# Patient Record
Sex: FEMALE | Race: WHITE | Employment: OTHER | URBAN - METROPOLITAN AREA
[De-identification: names, ages, dates, MRNs, and addresses within clinical notes are randomized per-mention and may not be internally consistent; named-entity substitution may affect disease eponyms.]

---

## 2018-05-23 PROBLEM — I35.8 AORTIC VALVE SCLEROSIS: Status: ACTIVE | Noted: 2018-05-23

## 2018-05-23 PROBLEM — R93.1 ABNORMAL ECHOCARDIOGRAM: Status: ACTIVE | Noted: 2018-05-23

## 2018-05-23 PROBLEM — I51.7 ATRIAL ENLARGEMENT, BILATERAL: Status: ACTIVE | Noted: 2018-05-23

## 2020-07-29 PROBLEM — I25.5 ISCHEMIC CARDIOMYOPATHY: Status: ACTIVE | Noted: 2020-07-29

## 2023-07-22 ENCOUNTER — APPOINTMENT (OUTPATIENT)
Dept: CT IMAGING | Age: 88
DRG: 439 | End: 2023-07-22
Payer: MEDICARE

## 2023-07-22 ENCOUNTER — APPOINTMENT (OUTPATIENT)
Dept: GENERAL RADIOLOGY | Age: 88
DRG: 439 | End: 2023-07-22
Payer: MEDICARE

## 2023-07-22 ENCOUNTER — HOSPITAL ENCOUNTER (INPATIENT)
Age: 88
LOS: 5 days | Discharge: HOSPICE | DRG: 439 | End: 2023-07-27
Attending: EMERGENCY MEDICINE | Admitting: FAMILY MEDICINE
Payer: MEDICARE

## 2023-07-22 DIAGNOSIS — R18.8 OTHER ASCITES: ICD-10-CM

## 2023-07-22 DIAGNOSIS — R10.9 ABDOMINAL PAIN, UNSPECIFIED ABDOMINAL LOCATION: Primary | ICD-10-CM

## 2023-07-22 PROBLEM — E03.9 ACQUIRED HYPOTHYROIDISM: Status: ACTIVE | Noted: 2022-11-06

## 2023-07-22 PROBLEM — K59.01 SLOW TRANSIT CONSTIPATION: Status: ACTIVE | Noted: 2022-10-30

## 2023-07-22 PROBLEM — D48.5 NEOPLASM OF UNCERTAIN BEHAVIOR OF SKIN: Status: ACTIVE | Noted: 2021-01-20
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
## 2023-07-22 PROBLEM — M41.9 SCOLIOSIS: Status: ACTIVE | Noted: 2021-01-20

## 2023-07-22 PROBLEM — K80.20 CALCULUS OF GALLBLADDER WITHOUT CHOLECYSTITIS WITHOUT OBSTRUCTION: Status: ACTIVE | Noted: 2023-07-22

## 2023-07-22 PROBLEM — S32.592D OTHER SPECIFIED FRACTURE OF LEFT PUBIS, SUBSEQUENT ENCOUNTER FOR FRACTURE WITH ROUTINE HEALING: Status: ACTIVE | Noted: 2022-10-30

## 2023-07-22 PROBLEM — I50.32 CHRONIC DIASTOLIC HEART FAILURE (HCC): Status: ACTIVE | Noted: 2022-10-30

## 2023-07-22 PROBLEM — I65.23 OCCLUSION AND STENOSIS OF BILATERAL CAROTID ARTERIES: Status: ACTIVE | Noted: 2021-01-20

## 2023-07-22 PROBLEM — R79.89 OTHER SPECIFIED ABNORMAL FINDINGS OF BLOOD CHEMISTRY: Status: ACTIVE | Noted: 2022-10-30

## 2023-07-22 PROBLEM — I48.0 PAROXYSMAL ATRIAL FIBRILLATION (HCC): Status: ACTIVE | Noted: 2023-05-15

## 2023-07-22 PROBLEM — S32.512D: Status: ACTIVE | Noted: 2022-10-30

## 2023-07-22 PROBLEM — I45.89 AV NODE DYSFUNCTION: Status: ACTIVE | Noted: 2022-06-07

## 2023-07-22 PROBLEM — Z66 DNR (DO NOT RESUSCITATE): Status: ACTIVE | Noted: 2023-07-22

## 2023-07-22 PROBLEM — M62.81 MUSCLE WEAKNESS (GENERALIZED): Status: ACTIVE | Noted: 2022-10-30

## 2023-07-22 PROBLEM — R10.84 GENERALIZED ABDOMINAL PAIN: Status: ACTIVE | Noted: 2023-07-22

## 2023-07-22 PROBLEM — R77.8 OTHER SPECIFIED ABNORMALITIES OF PLASMA PROTEINS: Status: ACTIVE | Noted: 2022-10-30

## 2023-07-22 PROBLEM — R11.2 NAUSEA AND VOMITING: Status: ACTIVE | Noted: 2023-07-22

## 2023-07-22 PROBLEM — K70.31 ASCITES DUE TO ALCOHOLIC CIRRHOSIS (HCC): Status: ACTIVE | Noted: 2023-07-22

## 2023-07-22 PROBLEM — R91.8 INFILTRATE OF LUNG PRESENT ON IMAGING OF CHEST: Status: ACTIVE | Noted: 2021-01-20

## 2023-07-22 PROBLEM — D64.9 ANEMIA: Status: ACTIVE | Noted: 2021-01-20

## 2023-07-22 PROBLEM — E53.8 COBALAMIN DEFICIENCY: Status: ACTIVE | Noted: 2021-01-20

## 2023-07-22 PROBLEM — R26.89 OTHER ABNORMALITIES OF GAIT AND MOBILITY: Status: ACTIVE | Noted: 2022-10-30

## 2023-07-22 LAB
ALBUMIN SERPL-MCNC: 2.8 G/DL (ref 3.5–5.2)
ALBUMIN/GLOB SERPL: 0.5 {RATIO} (ref 1–2.5)
ALP SERPL-CCNC: 173 U/L (ref 35–104)
ALT SERPL-CCNC: 54 U/L (ref 5–33)
AMYLASE SERPL-CCNC: 92 U/L (ref 28–100)
ANION GAP SERPL CALCULATED.3IONS-SCNC: 13 MMOL/L (ref 9–17)
AST SERPL-CCNC: 87 U/L
BACTERIA URNS QL MICRO: ABNORMAL
BASOPHILS # BLD: 0 K/UL (ref 0–0.2)
BASOPHILS NFR BLD: 0 % (ref 0–2)
BILIRUB DIRECT SERPL-MCNC: 0.7 MG/DL
BILIRUB INDIRECT SERPL-MCNC: 0.5 MG/DL (ref 0–1)
BILIRUB SERPL-MCNC: 1.2 MG/DL (ref 0.3–1.2)
BILIRUB UR QL STRIP: NEGATIVE
BNP SERPL-MCNC: 2514 PG/ML
BUN SERPL-MCNC: 79 MG/DL (ref 8–23)
CALCIUM SERPL-MCNC: 9.5 MG/DL (ref 8.6–10.4)
CHARACTER UR: ABNORMAL
CHLORIDE SERPL-SCNC: 101 MMOL/L (ref 98–107)
CLARITY UR: CLEAR
CO2 SERPL-SCNC: 21 MMOL/L (ref 20–31)
COLOR UR: YELLOW
CREAT SERPL-MCNC: 2.3 MG/DL (ref 0.5–0.9)
EOSINOPHIL # BLD: 0 K/UL (ref 0–0.4)
EOSINOPHILS RELATIVE PERCENT: 0 % (ref 1–4)
EPI CELLS #/AREA URNS HPF: ABNORMAL /HPF (ref 0–5)
ERYTHROCYTE [DISTWIDTH] IN BLOOD BY AUTOMATED COUNT: 16.9 % (ref 12.5–15.4)
GFR SERPL CREATININE-BSD FRML MDRD: 19 ML/MIN/1.73M2
GLUCOSE SERPL-MCNC: 218 MG/DL (ref 70–99)
GLUCOSE UR STRIP-MCNC: NEGATIVE MG/DL
HCT VFR BLD AUTO: 39.4 % (ref 36–46)
HGB BLD-MCNC: 13 G/DL (ref 12–16)
HGB UR QL STRIP.AUTO: NEGATIVE
KETONES UR STRIP-MCNC: ABNORMAL MG/DL
LACTATE BLDV-SCNC: 2.4 MMOL/L (ref 0.5–1.9)
LEUKOCYTE ESTERASE UR QL STRIP: NEGATIVE
LIPASE SERPL-CCNC: 160 U/L (ref 13–60)
LYMPHOCYTES NFR BLD: 2 K/UL (ref 1–4.8)
LYMPHOCYTES RELATIVE PERCENT: 22 % (ref 24–44)
MCH RBC QN AUTO: 32.9 PG (ref 26–34)
MCHC RBC AUTO-ENTMCNC: 33 G/DL (ref 31–37)
MCV RBC AUTO: 99.6 FL (ref 80–100)
METER GLUCOSE: 142 MG/DL (ref 65–105)
METER GLUCOSE: 146 MG/DL (ref 65–105)
METER GLUCOSE: 188 MG/DL (ref 65–105)
MONOCYTES NFR BLD: 0.6 K/UL (ref 0.1–1.2)
MONOCYTES NFR BLD: 7 % (ref 2–11)
MUCOUS THREADS URNS QL MICRO: ABNORMAL
NEUTROPHILS NFR BLD: 71 % (ref 36–66)
NEUTS SEG NFR BLD: 6.5 K/UL (ref 1.8–7.7)
NITRITE UR QL STRIP: NEGATIVE
PH UR STRIP: 5.5 [PH] (ref 5–8)
PLATELET # BLD AUTO: 213 K/UL (ref 140–450)
PMV BLD AUTO: 8.8 FL (ref 6–12)
POTASSIUM SERPL-SCNC: 5 MMOL/L (ref 3.7–5.3)
PROT SERPL-MCNC: 8.2 G/DL (ref 6.4–8.3)
PROT UR STRIP-MCNC: ABNORMAL MG/DL
RBC # BLD AUTO: 3.96 M/UL (ref 4–5.2)
RBC #/AREA URNS HPF: ABNORMAL /HPF (ref 0–2)
SODIUM SERPL-SCNC: 135 MMOL/L (ref 135–144)
SP GR UR STRIP: 1.02 (ref 1–1.03)
TROPONIN I SERPL HS-MCNC: 36 NG/L (ref 0–14)
UROBILINOGEN UR STRIP-ACNC: NORMAL EU/DL (ref 0–1)
WBC #/AREA URNS HPF: ABNORMAL /HPF (ref 0–5)
WBC OTHER # BLD: 9.3 K/UL (ref 3.5–11)

## 2023-07-22 PROCEDURE — 51798 US URINE CAPACITY MEASURE: CPT

## 2023-07-22 PROCEDURE — 6360000002 HC RX W HCPCS: Performed by: EMERGENCY MEDICINE

## 2023-07-22 PROCEDURE — 82150 ASSAY OF AMYLASE: CPT

## 2023-07-22 PROCEDURE — 74176 CT ABD & PELVIS W/O CONTRAST: CPT

## 2023-07-22 PROCEDURE — 2580000003 HC RX 258: Performed by: EMERGENCY MEDICINE

## 2023-07-22 PROCEDURE — 80048 BASIC METABOLIC PNL TOTAL CA: CPT

## 2023-07-22 PROCEDURE — 99285 EMERGENCY DEPT VISIT HI MDM: CPT

## 2023-07-22 PROCEDURE — 81001 URINALYSIS AUTO W/SCOPE: CPT

## 2023-07-22 PROCEDURE — 96375 TX/PRO/DX INJ NEW DRUG ADDON: CPT

## 2023-07-22 PROCEDURE — 2060000000 HC ICU INTERMEDIATE R&B

## 2023-07-22 PROCEDURE — 51702 INSERT TEMP BLADDER CATH: CPT

## 2023-07-22 PROCEDURE — 82947 ASSAY GLUCOSE BLOOD QUANT: CPT

## 2023-07-22 PROCEDURE — 71045 X-RAY EXAM CHEST 1 VIEW: CPT

## 2023-07-22 PROCEDURE — 6360000002 HC RX W HCPCS

## 2023-07-22 PROCEDURE — 96376 TX/PRO/DX INJ SAME DRUG ADON: CPT

## 2023-07-22 PROCEDURE — 83690 ASSAY OF LIPASE: CPT

## 2023-07-22 PROCEDURE — 83880 ASSAY OF NATRIURETIC PEPTIDE: CPT

## 2023-07-22 PROCEDURE — 85027 COMPLETE CBC AUTOMATED: CPT

## 2023-07-22 PROCEDURE — 84484 ASSAY OF TROPONIN QUANT: CPT

## 2023-07-22 PROCEDURE — 93005 ELECTROCARDIOGRAM TRACING: CPT

## 2023-07-22 PROCEDURE — 96374 THER/PROPH/DIAG INJ IV PUSH: CPT

## 2023-07-22 PROCEDURE — C9113 INJ PANTOPRAZOLE SODIUM, VIA: HCPCS | Performed by: EMERGENCY MEDICINE

## 2023-07-22 PROCEDURE — 99222 1ST HOSP IP/OBS MODERATE 55: CPT | Performed by: FAMILY MEDICINE

## 2023-07-22 PROCEDURE — 80076 HEPATIC FUNCTION PANEL: CPT

## 2023-07-22 PROCEDURE — 83605 ASSAY OF LACTIC ACID: CPT

## 2023-07-22 PROCEDURE — 36415 COLL VENOUS BLD VENIPUNCTURE: CPT

## 2023-07-22 RX ORDER — SODIUM CHLORIDE 0.9 % (FLUSH) 0.9 %
5-40 SYRINGE (ML) INJECTION PRN
Status: DISCONTINUED | OUTPATIENT
Start: 2023-07-22 | End: 2023-07-27 | Stop reason: HOSPADM

## 2023-07-22 RX ORDER — AMLODIPINE BESYLATE 5 MG/1
5 TABLET ORAL DAILY
Status: DISCONTINUED | OUTPATIENT
Start: 2023-07-22 | End: 2023-07-27 | Stop reason: HOSPADM

## 2023-07-22 RX ORDER — SODIUM CHLORIDE 0.9 % (FLUSH) 0.9 %
5-40 SYRINGE (ML) INJECTION EVERY 12 HOURS SCHEDULED
Status: DISCONTINUED | OUTPATIENT
Start: 2023-07-22 | End: 2023-07-27 | Stop reason: HOSPADM

## 2023-07-22 RX ORDER — ONDANSETRON 2 MG/ML
4 INJECTION INTRAMUSCULAR; INTRAVENOUS ONCE
Status: COMPLETED | OUTPATIENT
Start: 2023-07-22 | End: 2023-07-22

## 2023-07-22 RX ORDER — FUROSEMIDE 20 MG/1
20 TABLET ORAL DAILY
Status: DISCONTINUED | OUTPATIENT
Start: 2023-07-22 | End: 2023-07-27 | Stop reason: HOSPADM

## 2023-07-22 RX ORDER — MORPHINE SULFATE 10 MG/5ML
5 SOLUTION ORAL EVERY 6 HOURS PRN
Status: DISCONTINUED | OUTPATIENT
Start: 2023-07-22 | End: 2023-07-27 | Stop reason: HOSPADM

## 2023-07-22 RX ORDER — POTASSIUM CHLORIDE 7.45 MG/ML
10 INJECTION INTRAVENOUS PRN
Status: DISCONTINUED | OUTPATIENT
Start: 2023-07-22 | End: 2023-07-22

## 2023-07-22 RX ORDER — SODIUM CHLORIDE 9 MG/ML
INJECTION, SOLUTION INTRAVENOUS PRN
Status: DISCONTINUED | OUTPATIENT
Start: 2023-07-22 | End: 2023-07-23

## 2023-07-22 RX ORDER — HEPARIN SODIUM 5000 [USP'U]/ML
5000 INJECTION, SOLUTION INTRAVENOUS; SUBCUTANEOUS EVERY 8 HOURS SCHEDULED
Status: DISCONTINUED | OUTPATIENT
Start: 2023-07-22 | End: 2023-07-27 | Stop reason: HOSPADM

## 2023-07-22 RX ORDER — ONDANSETRON 2 MG/ML
4 INJECTION INTRAMUSCULAR; INTRAVENOUS EVERY 6 HOURS PRN
Status: DISCONTINUED | OUTPATIENT
Start: 2023-07-22 | End: 2023-07-27 | Stop reason: HOSPADM

## 2023-07-22 RX ORDER — MORPHINE SULFATE 10 MG/5ML
5 SOLUTION ORAL EVERY 4 HOURS PRN
Status: DISCONTINUED | OUTPATIENT
Start: 2023-07-22 | End: 2023-07-22

## 2023-07-22 RX ORDER — MORPHINE SULFATE 10 MG/5ML
8 SOLUTION ORAL EVERY 6 HOURS PRN
Status: DISCONTINUED | OUTPATIENT
Start: 2023-07-22 | End: 2023-07-27 | Stop reason: HOSPADM

## 2023-07-22 RX ORDER — ASPIRIN 81 MG/1
81 TABLET, CHEWABLE ORAL DAILY
Status: DISCONTINUED | OUTPATIENT
Start: 2023-07-22 | End: 2023-07-27 | Stop reason: HOSPADM

## 2023-07-22 RX ORDER — MORPHINE SULFATE 10 MG/5ML
2 SOLUTION ORAL EVERY 6 HOURS PRN
Status: DISCONTINUED | OUTPATIENT
Start: 2023-07-22 | End: 2023-07-27 | Stop reason: HOSPADM

## 2023-07-22 RX ORDER — ONDANSETRON 4 MG/1
4 TABLET, ORALLY DISINTEGRATING ORAL EVERY 8 HOURS PRN
Status: DISCONTINUED | OUTPATIENT
Start: 2023-07-22 | End: 2023-07-27 | Stop reason: HOSPADM

## 2023-07-22 RX ORDER — MORPHINE SULFATE 10 MG/5ML
8 SOLUTION ORAL EVERY 6 HOURS PRN
Status: DISCONTINUED | OUTPATIENT
Start: 2023-07-22 | End: 2023-07-22

## 2023-07-22 RX ORDER — ONDANSETRON 2 MG/ML
4 INJECTION INTRAMUSCULAR; INTRAVENOUS EVERY 6 HOURS PRN
Status: DISCONTINUED | OUTPATIENT
Start: 2023-07-22 | End: 2023-07-22 | Stop reason: SDUPTHER

## 2023-07-22 RX ORDER — NALOXONE HYDROCHLORIDE 0.4 MG/ML
0.4 INJECTION, SOLUTION INTRAMUSCULAR; INTRAVENOUS; SUBCUTANEOUS PRN
Status: DISCONTINUED | OUTPATIENT
Start: 2023-07-22 | End: 2023-07-27 | Stop reason: HOSPADM

## 2023-07-22 RX ORDER — ATENOLOL 25 MG/1
25 TABLET ORAL DAILY
Status: DISCONTINUED | OUTPATIENT
Start: 2023-07-22 | End: 2023-07-27 | Stop reason: HOSPADM

## 2023-07-22 RX ORDER — SODIUM CHLORIDE 9 MG/ML
INJECTION, SOLUTION INTRAVENOUS CONTINUOUS
Status: DISCONTINUED | OUTPATIENT
Start: 2023-07-22 | End: 2023-07-24

## 2023-07-22 RX ORDER — PANTOPRAZOLE SODIUM 40 MG/10ML
40 INJECTION, POWDER, LYOPHILIZED, FOR SOLUTION INTRAVENOUS ONCE
Status: COMPLETED | OUTPATIENT
Start: 2023-07-22 | End: 2023-07-22

## 2023-07-22 RX ORDER — ACETAMINOPHEN 325 MG/1
650 TABLET ORAL EVERY 6 HOURS PRN
Status: DISCONTINUED | OUTPATIENT
Start: 2023-07-22 | End: 2023-07-27 | Stop reason: HOSPADM

## 2023-07-22 RX ORDER — MORPHINE SULFATE 10 MG/5ML
2 SOLUTION ORAL EVERY 6 HOURS PRN
Status: DISCONTINUED | OUTPATIENT
Start: 2023-07-22 | End: 2023-07-22

## 2023-07-22 RX ORDER — POLYETHYLENE GLYCOL 3350 17 G/17G
17 POWDER, FOR SOLUTION ORAL DAILY PRN
Status: DISCONTINUED | OUTPATIENT
Start: 2023-07-22 | End: 2023-07-27 | Stop reason: HOSPADM

## 2023-07-22 RX ORDER — ACETAMINOPHEN 650 MG/1
650 SUPPOSITORY RECTAL EVERY 6 HOURS PRN
Status: DISCONTINUED | OUTPATIENT
Start: 2023-07-22 | End: 2023-07-27 | Stop reason: HOSPADM

## 2023-07-22 RX ORDER — MAGNESIUM SULFATE IN WATER 40 MG/ML
2000 INJECTION, SOLUTION INTRAVENOUS PRN
Status: DISCONTINUED | OUTPATIENT
Start: 2023-07-22 | End: 2023-07-22

## 2023-07-22 RX ORDER — POTASSIUM CHLORIDE 20 MEQ/1
40 TABLET, EXTENDED RELEASE ORAL PRN
Status: DISCONTINUED | OUTPATIENT
Start: 2023-07-22 | End: 2023-07-22

## 2023-07-22 RX ADMIN — ONDANSETRON 4 MG: 2 INJECTION INTRAMUSCULAR; INTRAVENOUS at 08:32

## 2023-07-22 RX ADMIN — PANTOPRAZOLE SODIUM 40 MG: 40 INJECTION, POWDER, FOR SOLUTION INTRAVENOUS at 08:30

## 2023-07-22 RX ADMIN — HEPARIN SODIUM 5000 UNITS: 5000 INJECTION INTRAVENOUS; SUBCUTANEOUS at 22:02

## 2023-07-22 RX ADMIN — SODIUM CHLORIDE: 9 INJECTION, SOLUTION INTRAVENOUS at 08:29

## 2023-07-22 RX ADMIN — SODIUM CHLORIDE: 9 INJECTION, SOLUTION INTRAVENOUS at 12:31

## 2023-07-22 RX ADMIN — HEPARIN SODIUM 5000 UNITS: 5000 INJECTION INTRAVENOUS; SUBCUTANEOUS at 14:31

## 2023-07-22 RX ADMIN — ONDANSETRON 4 MG: 2 INJECTION INTRAMUSCULAR; INTRAVENOUS at 14:30

## 2023-07-22 RX ADMIN — ONDANSETRON 4 MG: 2 INJECTION INTRAMUSCULAR; INTRAVENOUS at 10:24

## 2023-07-22 ASSESSMENT — PAIN - FUNCTIONAL ASSESSMENT: PAIN_FUNCTIONAL_ASSESSMENT: 0-10

## 2023-07-22 ASSESSMENT — ENCOUNTER SYMPTOMS
NAUSEA: 1
CONSTIPATION: 1
ABDOMINAL DISTENTION: 1
ABDOMINAL PAIN: 1
VOMITING: 1

## 2023-07-22 ASSESSMENT — PAIN DESCRIPTION - PAIN TYPE
TYPE: ACUTE PAIN
TYPE: ACUTE PAIN

## 2023-07-22 ASSESSMENT — PAIN DESCRIPTION - FREQUENCY: FREQUENCY: INTERMITTENT

## 2023-07-22 NOTE — ED NOTES
Report to [facility] - pt to be transported to floor. Daughter left for home.      Pat Avina RN  07/22/23 2763

## 2023-07-22 NOTE — ED PROVIDER NOTES
[facility] Emergency Department  [address] [address]. [facility] [address]  Phone: [phone]  Fax: [address]      Pt Name: Claudio Villela  MRN: [id number]  [address] 3/23/1927  Date of evaluation: 7/22/2023    CHIEF COMPLAINT       Chief Complaint   Patient presents with    Abdominal Pain    Bloated     Hx colon CA; abd distention x couple weeks and emesis couple days. HISTORY OF PRESENT ILLNESS    Claudio Villela is a 80 y.o. female who presents to the emergency department with abdominal pain and bloating over the past week getting progressively worse. Lives with her daughter at home. Admits to nausea and vomiting for the past couple of days. History of abdominal surgeries but cannot remember what. She does have a history of colon cancer. Denies chest pain or shortness of breath. No hematuria or dysuria. Denies any other complaints. REVIEW OF SYSTEMS       Constitutional: No fevers or chills   HEENT: No sore throat, rhinorrhea, or earache   Eyes: No blurry vision or double vision no drainage   Cardiovascular: No chest pain or tachycardia   Respiratory: No wheezing or shortness of breath no cough   Gastrointestinal: Positive nausea vomiting no diarrhea positive constipation positive abdominal pain and bloating  : No hematuria or dysuria   Musculoskeletal: Chronic lower extremity swelling no pain  Skin: No rash   Neurological: No focal neurologic complaints, paresthesias, weakness, or headache     PAST MEDICAL HISTORY    has a past medical history of CAD (coronary artery disease), Chronic kidney disease, Colon cancer ([address]), Hyperlipidemia, Hypertension, Macular degeneration, Type 2 diabetes mellitus without complication ([address] ), and Type II or unspecified type diabetes mellitus without mention of complication, not stated as uncontrolled. SURGICAL HISTORY      has a past surgical history that includes Colon surgery;  Colonoscopy; Coronary with Dr. Lona Rodriguez family resident for Dr. Jordan Tijerina agrees to admission. Long discussion with the patient and her daughter about CODE STATUS agreeable to DNR CCA. Patient does not want anything heroic done but does want testing and medication. CRITICAL CARE:    None    PROCEDURES:    None      OARRS Report if indicated           FINAL IMPRESSION      1. Abdominal pain, unspecified abdominal location    2. Other ascites          DISPOSITION/PLAN   DISPOSITION Decision To Admit 07/22/2023 09:53:26 AM        CONDITION ON DISPOSITION: STABLE       PATIENT REFERRED TO:  No follow-up provider specified. DISCHARGE MEDICATIONS:  New Prescriptions    No medications on file       (Please note that portions of this note were completed with a voice recognition program.  Efforts were made to edit the dictations but occasionally words are mis-transcribed. Additionally, portions of this note may also include information that was incorporated after care transfer to another provider that were not available at the time of my evaluation.   Some of this information could likely include laboratory values, vital sign updates, medications etc.)    Jes Gonzales DO   Attending Emergency Physician     Jes Gonzales DO  07/22/23 1027

## 2023-07-22 NOTE — PROGRESS NOTES
PHARMACY NOTE:    The electrolyte replacement protocol for potassium/magnesium has been discontinued per P&T guidelines because the patient has reduced renal function (CrCl < 30 mL/min). The patient's most recent potassium & magnesium levels are:  Recent Labs     07/22/23  0812   K 5.0     Estimated Creatinine Clearance: 12 mL/min (A) (based on SCr of 2.3 mg/dL (H)). For patients with decreased renal function (below 30ml/min) needing potassium/magnesium supplementation, please order individual bolus doses with appropriate monitoring. Please contact the inpatient pharmacy with any concerns. Thank you. Anton Gonzales,   PharmD  7/22/2023 12:03 PM

## 2023-07-22 NOTE — ED NOTES
Dr Miranda [facility] in examining pt - ED provider and FM provider having discussion with daughter and patient re: code status and agreeable to a [facility] Arrest code status.      Miranda Arroyo RN  07/22/23 6522

## 2023-07-22 NOTE — PLAN OF CARE
Problem: Discharge Planning  Goal: Discharge to home or other facility with appropriate resources  Outcome: Progressing   Patient actively participates in ADLs and decision making regarding plan of care. Problem: Pain  Goal: Verbalizes/displays adequate comfort level or baseline comfort level  Outcome: Progressing   No new signs/symptoms of pain noted, pain rating < 3 on scale 0-10, pain controlled with medication/repositioning. Problem: Skin/Tissue Integrity  Goal: Absence of new skin breakdown  Description: 1. Monitor for areas of redness and/or skin breakdown  2. Assess vascular access sites hourly  3. Every 4-6 hours minimum:  Change oxygen saturation probe site  4. Every 4-6 hours:  If on nasal continuous positive airway pressure, respiratory therapy assess nares and determine need for appliance change or resting period. Outcome: Progressing   No new skin breakdown noted, no new signs/symptoms of infection, continue to monitor lab work including WBC, medications administered per physician orders. Problem: Safety - Adult  Goal: Free from fall injury  Outcome: Progressing   No falls/injuries this shift, bed in lowest position, brakes on, bed alarm on, call light in reach, side rails up x2.

## 2023-07-23 LAB
METER GLUCOSE: 107 MG/DL (ref 65–105)
METER GLUCOSE: 148 MG/DL (ref 65–105)

## 2023-07-23 PROCEDURE — 99231 SBSQ HOSP IP/OBS SF/LOW 25: CPT | Performed by: FAMILY MEDICINE

## 2023-07-23 PROCEDURE — 6370000000 HC RX 637 (ALT 250 FOR IP)

## 2023-07-23 PROCEDURE — 2500000003 HC RX 250 WO HCPCS

## 2023-07-23 PROCEDURE — 82947 ASSAY GLUCOSE BLOOD QUANT: CPT

## 2023-07-23 PROCEDURE — 2060000000 HC ICU INTERMEDIATE R&B

## 2023-07-23 PROCEDURE — 6360000002 HC RX W HCPCS

## 2023-07-23 PROCEDURE — 2580000003 HC RX 258

## 2023-07-23 PROCEDURE — A4216 STERILE WATER/SALINE, 10 ML: HCPCS

## 2023-07-23 PROCEDURE — 2580000003 HC RX 258: Performed by: EMERGENCY MEDICINE

## 2023-07-23 RX ORDER — SODIUM CHLORIDE 9 MG/ML
INJECTION, SOLUTION INTRAVENOUS PRN
Status: DISCONTINUED | OUTPATIENT
Start: 2023-07-23 | End: 2023-07-27 | Stop reason: HOSPADM

## 2023-07-23 RX ADMIN — HEPARIN SODIUM 5000 UNITS: 5000 INJECTION INTRAVENOUS; SUBCUTANEOUS at 21:24

## 2023-07-23 RX ADMIN — SODIUM CHLORIDE: 9 INJECTION, SOLUTION INTRAVENOUS at 14:21

## 2023-07-23 RX ADMIN — MORPHINE SULFATE 2 MG: 10 SOLUTION ORAL at 11:41

## 2023-07-23 RX ADMIN — HEPARIN SODIUM 5000 UNITS: 5000 INJECTION INTRAVENOUS; SUBCUTANEOUS at 14:48

## 2023-07-23 RX ADMIN — FAMOTIDINE 20 MG: 10 INJECTION INTRAVENOUS at 11:29

## 2023-07-23 RX ADMIN — HEPARIN SODIUM 5000 UNITS: 5000 INJECTION INTRAVENOUS; SUBCUTANEOUS at 05:51

## 2023-07-23 RX ADMIN — SODIUM CHLORIDE: 9 INJECTION, SOLUTION INTRAVENOUS at 04:01

## 2023-07-23 RX ADMIN — ONDANSETRON 4 MG: 2 INJECTION INTRAMUSCULAR; INTRAVENOUS at 10:22

## 2023-07-23 ASSESSMENT — PAIN DESCRIPTION - LOCATION
LOCATION: BACK
LOCATION: BACK

## 2023-07-23 ASSESSMENT — PAIN SCALES - GENERAL
PAINLEVEL_OUTOF10: 0
PAINLEVEL_OUTOF10: 6

## 2023-07-23 ASSESSMENT — PAIN DESCRIPTION - PAIN TYPE: TYPE: ACUTE PAIN

## 2023-07-23 NOTE — PROGRESS NOTES
[facility] Residency  Inpatient Service     Progress Note  Attending Physician Statement  I have discussed the care of Sarkis Doss  including pertinent history and exam findings,  with the resident. I have reviewed the key elements of all parts of the encounter with the resident. I have had the opportunity at today's visit to personally discuss and repeat any exam deemed necessary. I agree with the assessment, plan and orders as documented by the resident. Opportunity to interview and examine patient during resident rounds this morning. Patient now much more alert and sitting/ reclining in bedside chair. Abdomen distended, and decreased tidal volume but not short of breath. Patient complain of back pain but controlled with medication. Had emesis a little while ago immediately following her coffee. Jennie Chacko MD   7/23/2023 7/23/2023    1:37 PM  Name:   Sarkis Doss  MRN:     [id number]     Acct:      [de-identified]   Room:   [address] [place] [address] Day:  1  Admit Date:  7/22/2023  7:53 AM    PCP:   DIANE Garcia CNP  Code Status:  DNR-CC    Subjective:   Patient is a 79 yo female with past medical history of colon cancer, chronic cirrhotic liver with ascites, and CABG who presents to the ED with abdominal distention/pain and emesis admitted for the management of abdominal pain. Overnight events: none. Today, pt was examined at bedside this morning. She is a bit hard of hearing, is AAOx3, was calm, and was sitting up in the bed with the help of a nurse and was unsteady and weak. She does not have any complaints currently, reports 0/10 pain and no nausea and has a non tender belly. She mainly complains of restless legs. The nurse assists her to the chair. ROS is negative except for points noted above. Medications: Allergies:     Allergies   Allergen Reactions    Amoxicillin     Benadryl [Diphenhydramine]        Current Hospice  Diet: ADULT DIET; Regular    Patient was seen, evaluated and discussed with Debbie Sweet MD    [facility] Resident, PGY-1   7/23/2023  1:37 PM     Senior Resident Attestation:    I have independently seen Symone Abler and discussed the assessment and plan with the intern listed above and the attending Kassie Edmond MD. I have reviewed the note and have included any edits or additional information above. DNR-CC, patient comfortable at this time. Have added daily Pepcid for symptomatic reflux. Pain meds onboard. Patient doing reasonably well currently. Hospice meeting hopefully tomorrow.     Jeffrey Abraham MD  Family Medicine Resident, PGY-3   7/23/2023  5:15 PM

## 2023-07-23 NOTE — PLAN OF CARE
Problem: Discharge Planning  Goal: Discharge to home or other facility with appropriate resources  Outcome: Progressing  Flowsheets (Taken 7/23/2023 0900)  Discharge to home or other facility with appropriate resources:   Identify barriers to discharge with patient and caregiver   Arrange for needed discharge resources and transportation as appropriate   Identify discharge learning needs (meds, wound care, etc)   Arrange for interpreters to assist at discharge as needed   Refer to discharge planning if patient needs post-hospital services based on physician order or complex needs related to functional status, cognitive ability or social support system     Problem: Pain  Goal: Verbalizes/displays adequate comfort level or baseline comfort level  Outcome: Progressing  Flowsheets (Taken 7/23/2023 0832)  Verbalizes/displays adequate comfort level or baseline comfort level:   Encourage patient to monitor pain and request assistance   Assess pain using appropriate pain scale   Administer analgesics based on type and severity of pain and evaluate response   Implement non-pharmacological measures as appropriate and evaluate response   Consider cultural and social influences on pain and pain management   Notify Licensed Independent Practitioner if interventions unsuccessful or patient reports new pain   Pain scale preformed per protocol and pt treated for pain as documented. Education given. Problem: Skin/Tissue Integrity  Goal: Absence of new skin breakdown  Description: 1. Monitor for areas of redness and/or skin breakdown  2. Assess vascular access sites hourly  3. Every 4-6 hours minimum:  Change oxygen saturation probe site  4. Every 4-6 hours:  If on nasal continuous positive airway pressure, respiratory therapy assess nares and determine need for appliance change or resting period. Outcome: Progressing  Skin assessment preformed. Pt turned every 2 hours with heals elevated off bed.  [place] mattress in

## 2023-07-24 LAB
EKG ATRIAL RATE: 74 BPM
EKG P AXIS: 31 DEGREES
EKG P-R INTERVAL: 200 MS
EKG Q-T INTERVAL: 390 MS
EKG QRS DURATION: 98 MS
EKG QTC CALCULATION (BAZETT): 432 MS
EKG R AXIS: -22 DEGREES
EKG T AXIS: -78 DEGREES
EKG VENTRICULAR RATE: 74 BPM
INR PPP: 1.2
PROTHROMBIN TIME: 13 SEC (ref 9.4–12.6)

## 2023-07-24 PROCEDURE — 2580000003 HC RX 258: Performed by: EMERGENCY MEDICINE

## 2023-07-24 PROCEDURE — 2580000003 HC RX 258

## 2023-07-24 PROCEDURE — 99232 SBSQ HOSP IP/OBS MODERATE 35: CPT | Performed by: FAMILY MEDICINE

## 2023-07-24 PROCEDURE — 6360000002 HC RX W HCPCS

## 2023-07-24 PROCEDURE — 85610 PROTHROMBIN TIME: CPT

## 2023-07-24 PROCEDURE — 2060000000 HC ICU INTERMEDIATE R&B

## 2023-07-24 PROCEDURE — 36415 COLL VENOUS BLD VENIPUNCTURE: CPT

## 2023-07-24 PROCEDURE — 6370000000 HC RX 637 (ALT 250 FOR IP)

## 2023-07-24 RX ADMIN — SODIUM CHLORIDE: 9 INJECTION, SOLUTION INTRAVENOUS at 04:29

## 2023-07-24 RX ADMIN — SODIUM CHLORIDE, PRESERVATIVE FREE 10 ML: 5 INJECTION INTRAVENOUS at 21:17

## 2023-07-24 RX ADMIN — ATENOLOL 25 MG: 25 TABLET ORAL at 09:07

## 2023-07-24 RX ADMIN — FUROSEMIDE 20 MG: 20 TABLET ORAL at 09:08

## 2023-07-24 RX ADMIN — HEPARIN SODIUM 5000 UNITS: 5000 INJECTION INTRAVENOUS; SUBCUTANEOUS at 06:03

## 2023-07-24 RX ADMIN — ASPIRIN 81 MG 81 MG: 81 TABLET ORAL at 09:07

## 2023-07-24 RX ADMIN — HEPARIN SODIUM 5000 UNITS: 5000 INJECTION INTRAVENOUS; SUBCUTANEOUS at 14:05

## 2023-07-24 RX ADMIN — HEPARIN SODIUM 5000 UNITS: 5000 INJECTION INTRAVENOUS; SUBCUTANEOUS at 21:17

## 2023-07-24 ASSESSMENT — PAIN SCALES - GENERAL
PAINLEVEL_OUTOF10: 0
PAINLEVEL_OUTOF10: 0

## 2023-07-24 NOTE — PROGRESS NOTES
[facility]  PROGRESS NOTE    Room # 327/327-01   Name: Sarah Tinajero            Zoroastrian: Juliocesar Avilez      Reason for visit: Routine    I visited the patient. Admit Date & Time: 7/22/2023  7:53 AM    Assessment:  Sarah Tinajero is a 80 y.o. female in the hospital because \"nausea and vomiting\". Upon entering the room patient was lying in bed. Patient appeared to be weak and tired. Patient shared with this writer that her  from [address] [place] [address] in [facility] is planning on being present at [address] [facility] today as well as her daughter. Patient invited prayer. Intervention:  I introduced myself and my title as  I offered space for patient  to express feelings, needs, and concerns and provided a ministry presence. This writer engaged in conversation with patient. This writer actively listened to patient. This writer also offered a brief prayer for comfort and peace. Outcome:  Patient expressed gratitude for this visit     Plan:  Chaplains will remain available to offer spiritual and emotional support as needed. Electronically signed by Justin Bobby on 7/24/2023 at 10:11 AM.  [address] -        07/24/23 1009   Encounter Summary   Service Provided For: Patient   Referral/Consult From:  [address] Children;Gnosticism/wai community   Last Encounter  07/24/23   Complexity of Encounter Moderate   Begin Time 1000   End Time  1010   Total Time Calculated 10 min   Encounter    Type Initial Screen/Assessment   Spiritual/Emotional needs   Type Spiritual Support   Assessment/Intervention/Outcome   Assessment Calm; Impaired resilience   Intervention Active listening;Sustaining Presence/Ministry of presence;Prayer (assurance of)/[place];Explored/Affirmed feelings, thoughts, concerns   Outcome Engaged in conversation;Expressed Gratitude;Coping

## 2023-07-24 NOTE — PLAN OF CARE
Problem: Discharge Planning  Goal: Discharge to home or other facility with appropriate resources  Outcome: Progressing     Problem: Pain  Goal: Verbalizes/displays adequate comfort level or baseline comfort level  Outcome: Progressing   Pain scale preformed per protocol and pt treated for pain as documented. Education given. Problem: Skin/Tissue Integrity  Goal: Absence of new skin breakdown  Description: 1. Monitor for areas of redness and/or skin breakdown  2. Assess vascular access sites hourly  3. Every 4-6 hours minimum:  Change oxygen saturation probe site  4. Every 4-6 hours:  If on nasal continuous positive airway pressure, respiratory therapy assess nares and determine need for appliance change or resting period. Outcome: Progressing   Skin assessment preformed. Pt turned every 2 hours with heals elevated off bed. [place] mattress in place. Will continue to monitor. Problem: Safety - Adult  Goal: Free from fall injury  Outcome: Progressing  Flowsheets (Taken 7/24/2023 0739)  Free From Fall Injury: Instruct family/caregiver on patient safety   Patient assessed for fall risk; fall precautions initiated. Patient and family instructed about safety devices. Environment kept free of clutter and adequate lighting provided. Bed locked and in lowest position. Call light within reach. Will continue to monitor.    Problem: ABCDS Injury Assessment  Goal: Absence of physical injury  Outcome: Progressing  Flowsheets (Taken 7/24/2023 0739)  Absence of Physical Injury: Implement safety measures based on patient assessment     Problem: Chronic Conditions and Co-morbidities  Goal: Patient's chronic conditions and co-morbidity symptoms are monitored and maintained or improved  Outcome: Progressing

## 2023-07-24 NOTE — CARE COORDINATION
Case Management Assessment  Initial Evaluation    Date/Time of Evaluation: 7/24/2023 4:40 PM  Assessment Completed by: Patricio Lane RN    If patient is discharged prior to next notation, then this note serves as note for discharge by case management. Patient Name: Rochelle Chisholm                   YOB: 1927  Diagnosis: Nausea and vomiting [R11.2]  Other ascites [R18.8]  Abdominal pain, unspecified abdominal location [R10.9]                   Date / Time: 7/22/2023  7:53 AM    Patient Admission Status: Inpatient   Readmission Risk (Low < 19, Mod (19-27), High > 27): Readmission Risk Score: 19.5    Current PCP: DIANE Coon CNP  PCP verified by CM? No    Chart Reviewed: Yes      History Provided by: Patient  Patient Orientation: Alert and Oriented    Patient Cognition: Alert    Hospitalization in the last 30 days (Readmission):  No    If yes, Readmission Assessment in  Navigator will be completed. Advance Directives:      Code Status: DNR-CC   Patient's Primary Decision Maker is: Legal Next of Kin      Discharge Planning:    Patient lives with: Children Type of Home: House  Primary Care Giver: Family  Patient Support Systems include: Children, Family Members   Current Financial resources: Medicare  Current community resources: None  Current services prior to admission: None            Current DME:              Type of Home Care services:  None    ADLS  Prior functional level: Assistance with the following:, Bathing, Dressing, Toileting, Feeding, Cooking, Housework, Mobility  Current functional level: Assistance with the following:, Bathing, Dressing, Toileting, Feeding, Cooking, Housework, Mobility    PT AM-PAC:   /24  OT AM-PAC:   /24    Family can provide assistance at DC: Yes  Would you like Case Management to discuss the discharge plan with any other family members/significant others, and if so, who?  Yes  Plans to Return to Present Housing: Unknown at present  Other Identified abdominal location [Z51.5]    IF APPLICABLE: The Patient and/or patient representative Fran King and her family were provided with a choice of provider and agrees with the discharge plan. Freedom of choice list with basic dialogue that supports the patient's individualized plan of care/goals and shares the quality data associated with the providers was provided to: Patient   Patient Representative Name:       The Patient and/or Patient Representative Agree with the Discharge Plan?  Yes    Vinnie Bernheim, RN  Case Management Department  Ph:  Fax:

## 2023-07-25 ENCOUNTER — HOSPITAL ENCOUNTER (OUTPATIENT)
Dept: INTERVENTIONAL RADIOLOGY/VASCULAR | Age: 88
Discharge: HOME | End: 2023-07-27
Payer: MEDICARE

## 2023-07-25 VITALS
DIASTOLIC BLOOD PRESSURE: 70 MMHG | OXYGEN SATURATION: 94 % | HEART RATE: 59 BPM | SYSTOLIC BLOOD PRESSURE: 138 MMHG | RESPIRATION RATE: 18 BRPM

## 2023-07-25 PROCEDURE — 99232 SBSQ HOSP IP/OBS MODERATE 35: CPT | Performed by: FAMILY MEDICINE

## 2023-07-25 PROCEDURE — 2060000000 HC ICU INTERMEDIATE R&B

## 2023-07-25 PROCEDURE — 49083 ABD PARACENTESIS W/IMAGING: CPT

## 2023-07-25 PROCEDURE — 0W9G3ZZ DRAINAGE OF PERITONEAL CAVITY, PERCUTANEOUS APPROACH: ICD-10-PCS | Performed by: RADIOLOGY

## 2023-07-25 PROCEDURE — 6360000002 HC RX W HCPCS

## 2023-07-25 PROCEDURE — 2580000003 HC RX 258

## 2023-07-25 RX ADMIN — SODIUM CHLORIDE, PRESERVATIVE FREE 10 ML: 5 INJECTION INTRAVENOUS at 21:52

## 2023-07-25 RX ADMIN — ONDANSETRON 4 MG: 2 INJECTION INTRAMUSCULAR; INTRAVENOUS at 21:52

## 2023-07-25 RX ADMIN — SODIUM CHLORIDE, PRESERVATIVE FREE 10 ML: 5 INJECTION INTRAVENOUS at 11:10

## 2023-07-25 ASSESSMENT — PAIN DESCRIPTION - LOCATION: LOCATION: BACK

## 2023-07-25 ASSESSMENT — PAIN DESCRIPTION - PAIN TYPE: TYPE: ACUTE PAIN

## 2023-07-25 ASSESSMENT — PAIN SCALES - GENERAL: PAINLEVEL_OUTOF10: 8

## 2023-07-25 NOTE — FLOWSHEET NOTE
[address]  PROGRESS NOTE    Room # 327/327-01   Name: Rochelle Chisholm            Pentecostalism: Alana Layne     Reason for visit:   referral    I visited the patient. Admit Date & Time: 7/22/2023  7:53 AM    Assessment:  Rochelle Chisholm is a 80 y.o. female in the hospital. Upon entering the room Writer observed Patient appeared to be sleeping. Intervention:  Writer offered a silent prayer for Patient. Outcome:  Patient did not respond. Plan:  Chaplains will remain available to offer spiritual and emotional support as needed.      07/25/23 1749   Encounter Summary   Service Provided For: Patient   Referral/Consult From:  [address] Children;Roman Catholic/wai community   Last Encounter  07/25/23   Complexity of Encounter Low   Begin Time 1745   End Time  1747   Total Time Calculated 2 min   Encounter    Type Follow up   Spiritual/Emotional needs   Type Spiritual Support   Assessment/Intervention/Outcome   Assessment Unable to assess   Intervention Prayer (assurance of)/[place]   Outcome Did not respond   Plan and Referrals   Plan/Referrals Continue Support (comment)       Electronically signed by Sheldon Jean on 7/25/2023 at 5:50 PM.  Jayden Hwang  [phone]

## 2023-07-25 NOTE — CARE COORDINATION
Discharge planning    Chart reviewed. S/P  paracentesis for 4.1 L. On RA> [facility] up to see patient. Patient does not qualify for inpatient hospice at this time. They can follow at [facility] from hospice [facility] did confirm her daughters phone number was [phone] and did change in epic. Call to daughter Elizabeth Wilson to discus plan of care and she was on her way up. [address] in the room and did discuss hospice [facility] findings. Per nursing patient has not been eating or drinking as of yet today and more lethargic. Explained to daughter that if patient does have status change can always have [facility] come out to reassess. Discussed possible need for pleurx but unable to do so until Friday. Then encouraged daughter to speak with doctors to see if something they still want to do. When discussing hospice at [facility] stated that patient has been to [address] and would like referral there. She understands that will have to cover room and board. [address] referral to [facility]. If unable to accept her second choice will be [facility] and then [facility].

## 2023-07-25 NOTE — BRIEF OP NOTE
Brief Postoperative Note for Paracentesis    Christie Burton  YOB: 1927  [id number]    Pre-operative Diagnosis:  Ascites     Post-operative Diagnosis: Same    Procedure: Ultrasound guided Paracentesis     Anesthesia: 1% Lidocaine     Surgeons/Assistants: Lou Melo PA-C    Complications: none    EBL: Minimal    Specimens: Were obtained    Ultrasound guided paracentesis performed. 4050 ml clear yellow fluid obtained. Dressing applied.      Electronically signed by MARIOLA Kraus on 7/25/2023 at 8:46 AM

## 2023-07-25 NOTE — PROGRESS NOTES
Paracentesis     MICHAEL Magdaleno RN  Dominique IR tech    Pt. To ultrasound room 8  Identified, allergies confirmed  Supine position  Stable    Time out complete. Prep to abd.    4050 ml of clear yellow colored fluid drawn off. 2x2 with Tegaderm to puncture site. Patient tolerated well. No problems noted.      Patient stable, off monitor reported to lore SRINIVASAN and transported by EMS back to [address] [place] [address]

## 2023-07-25 NOTE — PROGRESS NOTES
[facility]  PROGRESS NOTE    Room # 327/327-01   Name: Sarah Tinajero            Buddhism: Juliocesar Antonietaxavi      Reason for visit: Routine    I visited the patient. Admit Date & Time: 7/22/2023  7:53 AM    Assessment:  Sarah Tinajero is a 80 y.o. female in the hospital because \"nausea and vomiting\". Upon entering the room pt was sleeping       Intervention: This writer offered brief silent prayer for pt     Outcome:  Patient remained sleeping     Plan:  Chaplains will remain available to offer spiritual and emotional support as needed.     Electronically signed by Justin Bobby on 7/25/2023 at [address] -        07/25/23 1114   Encounter Summary   Service Provided For: Patient   Referral/Consult From: [address] [place] [address]   Last Encounter  07/25/23   Complexity of Encounter Low   Begin Time 1113   End Time  1115   Total Time Calculated 2 min   Encounter    Type Follow up   Spiritual/Emotional needs   Type Spiritual Support   Assessment/Intervention/Outcome   Assessment Unable to assess  (pt sleeping, no family present)   Intervention Prayer (assurance of)/[place]   Outcome Did not respond   Plan and Referrals   Plan/Referrals Continue to visit, (comment)

## 2023-07-26 PROCEDURE — 6360000002 HC RX W HCPCS

## 2023-07-26 PROCEDURE — 2580000003 HC RX 258

## 2023-07-26 PROCEDURE — 2060000000 HC ICU INTERMEDIATE R&B

## 2023-07-26 PROCEDURE — 6370000000 HC RX 637 (ALT 250 FOR IP)

## 2023-07-26 RX ORDER — MORPHINE SULFATE 0.5 MG/ML
2.5 INJECTION, SOLUTION EPIDURAL; INTRATHECAL; INTRAVENOUS EVERY 6 HOURS PRN
Status: DISCONTINUED | OUTPATIENT
Start: 2023-07-26 | End: 2023-07-26

## 2023-07-26 RX ORDER — MORPHINE SULFATE 2 MG/ML
2.5 INJECTION, SOLUTION INTRAMUSCULAR; INTRAVENOUS EVERY 6 HOURS PRN
Status: DISCONTINUED | OUTPATIENT
Start: 2023-07-26 | End: 2023-07-27 | Stop reason: HOSPADM

## 2023-07-26 RX ADMIN — HEPARIN SODIUM 5000 UNITS: 5000 INJECTION INTRAVENOUS; SUBCUTANEOUS at 06:54

## 2023-07-26 RX ADMIN — HEPARIN SODIUM 5000 UNITS: 5000 INJECTION INTRAVENOUS; SUBCUTANEOUS at 20:13

## 2023-07-26 RX ADMIN — SODIUM CHLORIDE, PRESERVATIVE FREE 10 ML: 5 INJECTION INTRAVENOUS at 20:13

## 2023-07-26 RX ADMIN — MORPHINE SULFATE 5 MG: 10 SOLUTION ORAL at 11:48

## 2023-07-26 ASSESSMENT — PAIN SCALES - GENERAL
PAINLEVEL_OUTOF10: 0
PAINLEVEL_OUTOF10: 0
PAINLEVEL_OUTOF10: 4
PAINLEVEL_OUTOF10: 0
PAINLEVEL_OUTOF10: 4
PAINLEVEL_OUTOF10: 0

## 2023-07-26 ASSESSMENT — PAIN DESCRIPTION - LOCATION
LOCATION: GENERALIZED
LOCATION: GENERALIZED

## 2023-07-26 NOTE — PLAN OF CARE
Problem: Pain  Goal: Verbalizes/displays adequate comfort level or baseline comfort level  Outcome: Progressing  Flowsheets (Taken 7/26/2023 0800)  Verbalizes/displays adequate comfort level or baseline comfort level:   Encourage patient to monitor pain and request assistance   Assess pain using appropriate pain scale   Administer analgesics based on type and severity of pain and evaluate response   Implement non-pharmacological measures as appropriate and evaluate response   Consider cultural and social influences on pain and pain management   Notify Licensed Independent Practitioner if interventions unsuccessful or patient reports new pain   Pain scale preformed per protocol and pt treated for pain as documented. Education given. Problem: Skin/Tissue Integrity  Goal: Absence of new skin breakdown  Description: 1. Monitor for areas of redness and/or skin breakdown  2. Assess vascular access sites hourly  3. Every 4-6 hours minimum:  Change oxygen saturation probe site  4. Every 4-6 hours:  If on nasal continuous positive airway pressure, respiratory therapy assess nares and determine need for appliance change or resting period. Outcome: Progressing   Skin assessment preformed. Pt turned every 2 hours with heals elevated off bed. [place] mattress in place. Will continue to monitor. Problem: Safety - Adult  Goal: Free from fall injury  Outcome: Progressing   Patient assessed for fall risk; fall precautions initiated. Patient and family instructed about safety devices. Environment kept free of clutter and adequate lighting provided. Bed locked and in lowest position. Call light within reach. Will continue to monitor.

## 2023-07-26 NOTE — CARE COORDINATION
Discharge Planning    Call to [facility] at [phone] and VM left with admissions to see if they have reviewed case for as of yet. Await call back     1100  Call to [facility] and spoke with Sophia Verma. Updated on patient not eating and drinking. Sophia Verma stated they do not have any beds available at this time at either location. She is going to check with supervisor because she is not taking any medications at this time. Still have not heard a response from [facility]. Did speak with daughter and alternate locations would be [facility] or [place] [facility]. Epic referral made to them     [address] called back and will evaluate again tomorrow at 1330. Perfect serve resident.  Notified RN

## 2023-07-26 NOTE — PLAN OF CARE
Problem: Pain  Goal: Verbalizes/displays adequate comfort level or baseline comfort level  Outcome: Progressing     Problem: Skin/Tissue Integrity  Goal: Absence of new skin breakdown  Description: 1. Monitor for areas of redness and/or skin breakdown  2. Assess vascular access sites hourly  3. Every 4-6 hours minimum:  Change oxygen saturation probe site  4. Every 4-6 hours:  If on nasal continuous positive airway pressure, respiratory therapy assess nares and determine need for appliance change or resting period.   Outcome: Progressing     Problem: Safety - Adult  Goal: Free from fall injury  Outcome: Progressing  Flowsheets (Taken 7/25/2023 1722 by Ananya Weller RN)  Free From Fall Injury: Instruct family/caregiver on patient safety     Problem: Chronic Conditions and Co-morbidities  Goal: Patient's chronic conditions and co-morbidity symptoms are monitored and maintained or improved  Outcome: Progressing  Flowsheets (Taken 7/25/2023 2000)  Care Plan - Patient's Chronic Conditions and Co-Morbidity Symptoms are Monitored and Maintained or Improved:   Monitor and assess patient's chronic conditions and comorbid symptoms for stability, deterioration, or improvement   Update acute care plan with appropriate goals if chronic or comorbid symptoms are exacerbated and prevent overall improvement and discharge

## 2023-07-26 NOTE — CARE COORDINATION
Discharge Planning:   Chris Oxana agreeable to accept. Informed that patient family should know that room and board will be private pay and hospice covers medications. Will inform RN case manager. Hospice meeting tomorrow. Possibility for inpatient criteria to be met. Will continue to follow.

## 2023-07-27 VITALS
BODY MASS INDEX: 28.78 KG/M2 | WEIGHT: 146.61 LBS | SYSTOLIC BLOOD PRESSURE: 117 MMHG | DIASTOLIC BLOOD PRESSURE: 61 MMHG | OXYGEN SATURATION: 94 % | RESPIRATION RATE: 12 BRPM | HEART RATE: 64 BPM | HEIGHT: 60 IN | TEMPERATURE: 97.9 F

## 2023-07-27 PROCEDURE — 6370000000 HC RX 637 (ALT 250 FOR IP)

## 2023-07-27 PROCEDURE — 2580000003 HC RX 258

## 2023-07-27 PROCEDURE — 99238 HOSP IP/OBS DSCHRG MGMT 30/<: CPT | Performed by: FAMILY MEDICINE

## 2023-07-27 RX ADMIN — SODIUM CHLORIDE, PRESERVATIVE FREE 10 ML: 5 INJECTION INTRAVENOUS at 07:56

## 2023-07-27 RX ADMIN — MORPHINE SULFATE 8 MG: 10 SOLUTION ORAL at 08:59

## 2023-07-27 ASSESSMENT — PAIN SCALES - GENERAL
PAINLEVEL_OUTOF10: 0
PAINLEVEL_OUTOF10: 7

## 2023-07-27 ASSESSMENT — PAIN SCALES - WONG BAKER: WONGBAKER_NUMERICALRESPONSE: 0

## 2023-07-27 NOTE — DISCHARGE INSTR - COC
Continuity of Care Form    Patient Name: Moises Oswald   :  3/23/1927  MRN:  [id number]    Admit date:  2023  Discharge date:  ***    Code Status Order: DNR-CC   Advance Directives:     Admitting Physician:  Sabrina Delgado MD  PCP: Guerrero Villanueva, APRN - CNP    Discharging Nurse: [facility] Unit/Room#: 327/327-01  Discharging Unit Phone Number: ***    Emergency Contact:   Extended Emergency Contact Information  Primary Emergency Contact: [facility] in [place] of [address] Phone: [phone]  Relation: Child    Past Surgical History:  Past Surgical History:   Procedure Laterality Date    COLON SURGERY      COLONOSCOPY      CORONARY ARTERY BYPASS GRAFT      CABG x 3     PACEMAKER PLACEMENT   and     [place] [employer]        Immunization History: There is no immunization history on file for this patient.     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    Ischemic cardiomyopathy I25.5    Nausea and vomiting R11.2    Abdominal pain R10.9    Calculus of gallbladder without cholecystitis without obstruction K80.20    Ascites due to alcoholic cirrhosis ([address]) A31.50    DNR (do not resuscitate) Z66    Acquired hypothyroidism E03.9    Anemia D64.9    AV node dysfunction I45.89    Chronic diastolic heart failure ([address]) I50.32    Cobalamin deficiency E53.8    Fall on same level, unspecified, subsequent encounter W18.30XD    Fracture of superior rim of left pubis, subsequent encounter for fracture with routine healing S32.512D

## 2023-07-27 NOTE — PROGRESS NOTES
[facility]  PROGRESS NOTE    Room # 327/327-01   Name: Lissa Olivares            Yarsanism: Renetta Chatterjee      Reason for visit: Follow up    I visited the patient. Admit Date & Time: 7/22/2023  7:53 AM    Assessment:  Lissa Olivares is a 80 y.o. female in the hospital because \"nausea and vomiting\". Upon entering the room pt was sleeping. Intervention: This writer offered brief silent prayer for pt     Outcome:  Patient remained sleeping     Plan:  Chaplains will remain available to offer spiritual and emotional support as needed.     Electronically signed by Capri Rea on 7/27/2023 at 12:11 PM.  [id number] -        07/27/23 1210   Encounter Summary   Service Provided For: Patient   Referral/Consult From: [address] [place] [address]   Last Encounter  07/27/23   Complexity of Encounter Low   Begin Time 1135   End Time  1137   Total Time Calculated 2 min   Encounter    Type Follow up   Spiritual/Emotional needs   Type Spiritual Support   Assessment/Intervention/Outcome   Assessment Unable to assess  (pt sleeping, no family present)   Intervention Prayer (assurance of)/[place]   Outcome Did not respond   Plan and Referrals   Plan/Referrals Continue to visit, (comment)

## 2023-07-27 NOTE — PLAN OF CARE
Problem: Discharge Planning  Goal: Discharge to home or other facility with appropriate resources  7/26/2023 2243 by Lucas Roblero RN  Outcome: Progressing     Problem: Pain  Goal: Verbalizes/displays adequate comfort level or baseline comfort level  7/26/2023 2243 by Lucas Roblero RN  Outcome: Progressing     Problem: Skin/Tissue Integrity  Goal: Absence of new skin breakdown  Description: 1. Monitor for areas of redness and/or skin breakdown  2. Assess vascular access sites hourly  3. Every 4-6 hours minimum:  Change oxygen saturation probe site  4. Every 4-6 hours:  If on nasal continuous positive airway pressure, respiratory therapy assess nares and determine need for appliance change or resting period.   7/26/2023 2243 by Lucas Roblero RN  Outcome: Progressing     Problem: Safety - Adult  Goal: Free from fall injury  7/26/2023 2243 by Lucas Roblero RN  Outcome: Progressing     Problem: ABCDS Injury Assessment  Goal: Absence of physical injury  7/26/2023 2243 by Lucas Roblero RN  Outcome: Progressing

## 2023-07-27 NOTE — PLAN OF CARE
Problem: Discharge Planning  Goal: Discharge to home or other facility with appropriate resources  Outcome: Completed  Flowsheets (Taken 7/27/2023 0800)  Discharge to home or other facility with appropriate resources:   Identify barriers to discharge with patient and caregiver   Arrange for needed discharge resources and transportation as appropriate   Identify discharge learning needs (meds, wound care, etc)   Refer to discharge planning if patient needs post-hospital services based on physician order or complex needs related to functional status, cognitive ability or social support system   Pt discharged via wheelchair with all belongings, scripts and discharge paperwork. Follow up appointments and discharge instructions reviewed with pt and care giver. All question answered to satisfaction. Problem: Pain  Goal: Verbalizes/displays adequate comfort level or baseline comfort level  Outcome: Completed   Pain scale preformed per protocol and pt treated for pain as documented. Education given. Problem: Safety - Adult  Goal: Free from fall injury  Outcome: Completed   Patient assessed for fall risk; fall precautions initiated. Patient and family instructed about safety devices. Environment kept free of clutter and adequate lighting provided. Bed locked and in lowest position. Call light within reach. Will continue to monitor.

## 2023-07-29 NOTE — DISCHARGE SUMMARY
[facility]  Inpatient Service    Discharge Summary     Patient ID: Thelma Roman  :  3/23/1927   MRN: [id number]     ACCOUNT:  [de-identified]   Patient's PCP: DIANE Leon CNP  Admit Date: 2023   Discharge Date: 2023  Length of Stay: 5  Code Status:  Prior  Admitting Physician: Bro Vincent MD  Discharge Physician: Curtis Ashby MD     Active Discharge Diagnoses:     Hospital Problem Lists:  Principal Problem:    Nausea and vomiting  Active Problems:    CKD (chronic kidney disease) stage 4, GFR 15-29 ml/min (HCC)    Cancer, colon (HCC)    Abdominal pain    Ascites due to alcoholic cirrhosis ([address])    DNR (do not resuscitate)  Resolved Problems:    * No resolved hospital problems. *      Admission Condition:  poor     Discharged Condition: poor    Hospital Stay:     Hospital Course:  Thelma Roman is a 80 y.o. female with a PMH of colon cancer, chronic cirrhotic liver with ascites, and CABG who presented to ED with complaint of with abdominal distention/pain and emesis admitted for the management of abdominal.      Patient desired on admission to withdraw from care. Received paracentesis for comfort care, and IV morphine. Patient began refusing to eat and drink. Patient was discharged to hospice as her status declined. In the ED, In the ED patient was given IV fluids, Zofran, and Protonix. CT abdomen pelvis without contrast 2023 shows large amount of abdominal and pelvic ascites. Chronic liver, large amount of interloop, cholelithiasis, mild right-sided hydronephrosis without obstructing calculus or any hydro-ureter, moderate sigmoid diverticulosis, moderate stool burden, large amount of stool in the rectal vault, atrophic uterus, 2 mm anterolisthesis of the L4 on L5, prior median sternotomy and CABG, coronary artery disease.  CXR 2023: Mild bibasilar atelectasis, no pleural effusions, left-sided subclavian approach 79      Patient desired on admission to withdraw from care. Received paracentesis for comfort care, and IV morphine. Patient began refusing to eat and drink. Patient was discharged to hospice as her status declined. Significant therapeutic interventions: Paracentesis     Significant Diagnostic Studies:  N/A    Labs:  Hematology:No results for input(s): WBC, RBC, HGB, HCT, MCV, MCH, MCHC, RDW, PLT, MPV, SEDRATE, CRP, INR, DDIMER, JN1EXJLF, LABABSO in the last 72 hours. Invalid input(s): PT  Chemistry:No results for input(s): NA, K, CL, CO2, GLUCOSE, BUN, CREATININE, MG, ANIONGAP, LABGLOM, GFRAA, CALCIUM, CAION, PHOS, PSA, PROBNP, TROPHS, CKTOTAL, CKMB, CKMBINDEX, MYOGLOBIN, DIGOXIN, LACTACIDWB in the last 72 hours. No results for input(s): PROT, LABALBU, LABA1C, T6IZCRR, E5VEUQK, FT4, TSH, AST, ALT, LDH, GGT, ALKPHOS, LABGGT, BILITOT, BILIDIR, AMMONIA, AMYLASE, LIPASE, LACTATE, CHOL, HDL, LDLCHOLESTEROL, CHOLHDLRATIO, TRIG, VLDL, XZR59NT, PHENYTOIN, PHENYF, URICACID, POCGLU in the last 72 hours. ABG:No results found for: POCPH, PHART, PH, POCPCO2, WKI8VDP, PCO2, POCPO2, PO2ART, PO2, POCHCO3, AXW2EXZ, HCO3, NBEA, PBEA, BEART, BE, THGBART, THB, OWV5KSB, FPBT9HHS, Z6LJDLXD, O2SAT, FIO2  No results found for: SPECIAL  No results found for: CULTURE    Radiology:  IR US GUIDED PARACENTESIS    Result Date: 7/27/2023  Successful ultrasound-guided paracentesis. Consultations:    Consults:     Final Specialist Recommendations/Findings:   IP CONSULT TO HOSPICE      The patient was seen and examined on day of discharge and this discharge summary is in conjunction with any daily progress note from day of discharge. Discharge plan:     Disposition: Hospice     Physician Follow Up:   No follow-up provider specified.      Requiring Further Evaluation/Follow Up POST HOSPITALIZATION/Incidental Findings/Notes to PCP:   N/A    Diet: regular diet    Activity: As tolerated    Instructions given to patient: N/A    Discharge

## 2023-08-07 NOTE — PROGRESS NOTES
Physician Progress Note      Blayne Appiah  [facility] #:                  [id number]  :                       3/23/1927  ADMIT DATE:       2023 7:53 AM  DISCH DATE:        2023 4:00 PM  RESPONDING  PROVIDER #:         [facility] TEXT:    Patient admitted with Abdominal pain, nausea/vomiting with chronic cirrhotic   liver. Noted documentation of Ascites due to alcoholic cirrhosis in H/P also   Lipase 160 indicate mild pancreatitis . In order to support the diagnosis of   Acute pancreatitis, please include additional clinical indicators in your   documentation. Or please document if the diagnosis of Acute pancreatitis has   been ruled out after further study. The medical record reflects the following:  Risk Factors: Alcohol cirrhotic liver, CKD 4, Dehydration, N/V. HX Colon CA  Clinical Indicators: CT ABD CT abdomen pelvis without contrast showed large   amount of abdominal pelvic ascites, cirrhotic liver, large amount of interloop   fluid, cholelithiasis, mild right-sided hydronephrosis without obstructing   calculus, moderate sigmoid diverticulosis, moderate stool burden, and coronary   arteries. H/P Lipase 160 indicate mild pancreatitis. Elevated liver enzymes   secondary to cirrhotic with AST 87, ALT 54, Alk Phos 173. Albumin 2.8 (likely   due to portal hypertension). Brief OP Note  Ultrasound guided paracentesis   performed. 4050 ml clear yellow fluid   Treatment: Paracentesis    Please call with any questions. Esme Helm RN,BSN,[facility]  M-F 6am-230pm  [phone]  Options provided:  -- Acute Pancreatitis present  on admission as evidenced by##, Please document   evidence.   -- Acute pancreatitis  was ruled out, as patient has Alcoholic cirrhosis of   liver with ascites  -- Other - I will add my own diagnosis  -- Disagree - Not applicable / Not valid  -- Disagree - Clinically unable to determine / Unknown  -- Refer to Clinical Documentation